# Patient Record
Sex: FEMALE | Race: WHITE
[De-identification: names, ages, dates, MRNs, and addresses within clinical notes are randomized per-mention and may not be internally consistent; named-entity substitution may affect disease eponyms.]

---

## 2017-10-18 ENCOUNTER — HOSPITAL ENCOUNTER (OUTPATIENT)
Dept: HOSPITAL 47 - RADUSWWP | Age: 57
End: 2017-10-18
Payer: COMMERCIAL

## 2017-10-18 DIAGNOSIS — N88.8: Primary | ICD-10-CM

## 2017-10-18 PROCEDURE — 76856 US EXAM PELVIC COMPLETE: CPT

## 2017-10-18 NOTE — US
EXAMINATION TYPE: US pelvic complete

 

DATE OF EXAM: 10/18/2017

 

COMPARISON: NONE

 

CLINICAL HISTORY: R10.2 Pelvic Pain. Patient stated upper right pelvic pain > upper left pelvic pain;
 ; post menopausal

 

TECHNIQUE:  Transabdominal (TA)

 

Date of LMP:  approximately age 50 

 

EXAM MEASUREMENTS:

 

Uterus:  6.8 x 4.2 x 2.6 cm

Endometrial Stripe: 0.4 cm

Right Ovary:  1.8 x 1.5 x 0.8 cm

Left Ovary:  1.8 x 1.2 x 1.6 cm

 

 

 

1. Uterus:  Anteverted; small Nabothian Cyst in CX = 0.5 x 0.6 x 0.5cm  

2. Endometrium:  wnl postmenopausal

3. Right Ovary:  wnl; bowel is imaged surrounding rt ovary

4. Left Ovary:  wnl

**Spectral, color and waveform doppler imaging shows arterial and venous flow within the ovaries; the
re is no evidence for ovarian torsion.

5. Bilateral Adnexa:  wnl

6. Posterior cul-de-sac:  wnl

 

 

 

IMPRESSION: 

1. Unremarkable appearance of the ovaries and uterus.

2. Endometrial thickness is within normal limits for a postmenopausal female.

3. Incidentally noted small nabothian cervical cyst.

## 2018-09-20 ENCOUNTER — HOSPITAL ENCOUNTER (OUTPATIENT)
Dept: HOSPITAL 47 - RADCTMAIN | Age: 58
Discharge: HOME | End: 2018-09-20
Attending: GENERAL PRACTICE
Payer: COMMERCIAL

## 2018-09-20 DIAGNOSIS — K56.41: ICD-10-CM

## 2018-09-20 DIAGNOSIS — N20.0: Primary | ICD-10-CM

## 2018-09-20 PROCEDURE — 74176 CT ABD & PELVIS W/O CONTRAST: CPT

## 2018-09-20 NOTE — CT
EXAMINATION TYPE: CT abdomen pelvis wo con

 

DATE OF EXAM: 9/20/2018

 

HISTORY: right sided flank pain

 

CT DLP: 793 mGycm.  Automated Exposure Control for Dose Reduction was Utilized.

 

TECHNIQUE:  CT scan of the abdomen and pelvis is performed without oral or IV contrast.

 

COMPARISON: NONE

 

FINDINGS:  Within the limitations of a non-contrast study, the following observations are made.

 

LUNG BASES: Fairly dense fibroglandular tissue is seen in visualized portion of both breasts. Coronar
y artery calcification is noted which is noted marker for coronary artery disease. This focal linear 
scarring anteriorly in the right lung base near axial image 5 and laterally in the right middle lobe 
near axial image 15.

 

LIVER/GB: Gallbladder is not visualized and presumed surgically absent. 

 

PANCREAS: There is 1.2 cm oval low dense lesion in the region of the mid pancreatic body coronal imag
e 34 without suspicious ductal dilatation near axial image 43 that warrants follow-up. There is addit
ional 1.4 x 1.0 cm low dense lesion in the inferior pancreatic head axial image 54 correlating with c
oronal image 34.

 

SPLEEN: No significant abnormality is seen.

 

ADRENALS: Slight low dense thickening to left adrenal gland favors benign hyperplasia axial image 43.


 

KIDNEYS: There are 2-3 small calculi left kidney measuring 2 mm or smaller in size. No right-sided re
nal calculi are seen. No hydronephrosis or obstructing ureteral calculi are clearly seen bilaterally.
 No intraluminal calculi are seen in bladder.

 

BOWEL: Evaluation of bowel is suboptimal secondary to lack of enteric contrast and patient having lit
tle intra-abdominal fat. Normal-appearing appendix is seen from cecum. There is no suspicious small o
r large bowel dilatation. There is fairly moderate to severe diffuse fecal prominence throughout the 
colon however noted.

 

GENITAL ORGANS: There is retroflexed but anteverted uterus projecting to the left of midline.

 

LYMPH NODES: No greater than 1cm abdominal or pelvic lymph nodes are appreciated.

 

OSSEOUS STRUCTURES: There is fairly moderate axial joint space loss in both hips.

 

OTHER: No significant additional abnormality is seen.

 

IMPRESSION:

1. Small left-sided renal calculi. No hydronephrosis or obstructing renal calculi clearly seen bilate
rally. No definitive right-sided renal calculi seen.

2. No bowel obstruction is present. There is however fairly moderate to severe diffuse colonic fecal 
stasis noted.

3. Nonspecific low dense pancreatic lesions, cannot exclude solid or cystic pancreatic masses. Furthe
r investigation with pancreatic protocol contrast-enhanced CT or MRI is advised to further evaluate a
nd characterize.

## 2018-11-19 ENCOUNTER — HOSPITAL ENCOUNTER (OUTPATIENT)
Dept: HOSPITAL 47 - RADUSWWP | Age: 58
End: 2018-11-19
Attending: INTERNAL MEDICINE
Payer: COMMERCIAL

## 2018-11-19 DIAGNOSIS — N18.3: Primary | ICD-10-CM

## 2018-11-19 PROCEDURE — 76700 US EXAM ABDOM COMPLETE: CPT

## 2018-11-19 NOTE — US
EXAMINATION TYPE: US abdomen complete

 

DATE OF EXAM: 11/19/2018

 

COMPARISON: CT 9/20/2018 CLINICAL HISTORY: 58-year-old female N18.3 Chronic Kidney Stage 3.

 

TECHNIQUE: Multiple sonographic images of the abdomen are obtained.

 

FINDINGS:

 

EXAM MEASUREMENTS:

 

Liver Length:  10.8 cm   

Gallbladder Wall:  0.3 cm   

CBD:  0.5 cm

Spleen:  9.3 cm   

Right Kidney:  9.2 x 4.3 x 4.7 cm 

Left Kidney:  9.2 x 5.0 x 6.0 cm   

 

Sonographer notes: Technically difficult study due to overlying bowel gas.

 

Pancreas:  not well visualized due to midline bowel gas

Liver:  wnl  

Gallbladder:  No stones seen

**Evidence for sonographic Mark's sign:  No

CBD:  wnl 

Spleen:  wnl   

Right Kidney:  No hydronephrosis.

Left Kidney:  No hydronephrosis.

Upper IVC:  wnl  

Abd Aorta:  wnl

 

 

 

 

 

IMPRESSION: 

Suboptimal visualization of the pancreas. Otherwise, unremarkable sonographic examination of the abdo
men.

## 2021-04-23 ENCOUNTER — HOSPITAL ENCOUNTER (OUTPATIENT)
Dept: HOSPITAL 47 - RADCTMAIN | Age: 61
Discharge: HOME | End: 2021-04-23
Attending: FAMILY MEDICINE
Payer: COMMERCIAL

## 2021-04-23 DIAGNOSIS — R14.0: Primary | ICD-10-CM

## 2021-04-23 LAB
ALBUMIN SERPL-MCNC: 3.6 G/DL (ref 3.5–5)
ALP SERPL-CCNC: 83 U/L (ref 38–126)
ALT SERPL-CCNC: 14 U/L (ref 4–34)
AMYLASE SERPL-CCNC: 104 U/L (ref 30–110)
ANION GAP SERPL CALC-SCNC: 6 MMOL/L
AST SERPL-CCNC: 29 U/L (ref 14–36)
BASOPHILS # BLD MANUAL: 0.07 K/UL (ref 0–0.2)
BUN SERPL-SCNC: 14 MG/DL (ref 7–17)
CALCIUM SPEC-MCNC: 8.9 MG/DL (ref 8.4–10.2)
CELLS COUNTED: 100
CHLORIDE SERPL-SCNC: 102 MMOL/L (ref 98–107)
CO2 SERPL-SCNC: 28 MMOL/L (ref 22–30)
ERYTHROCYTE [DISTWIDTH] IN BLOOD BY AUTOMATED COUNT: 3.69 M/UL (ref 3.8–5.4)
ERYTHROCYTE [DISTWIDTH] IN BLOOD: 12.2 % (ref 11.5–15.5)
GLUCOSE SERPL-MCNC: 128 MG/DL (ref 74–99)
HCT VFR BLD AUTO: 35.1 % (ref 34–46)
HGB BLD-MCNC: 12.2 GM/DL (ref 11.4–16)
LIPASE SERPL-CCNC: 100 U/L (ref 23–300)
LYMPHOCYTES # BLD MANUAL: 1.78 K/UL (ref 1–4.8)
MCH RBC QN AUTO: 33 PG (ref 25–35)
MCHC RBC AUTO-ENTMCNC: 34.6 G/DL (ref 31–37)
MCV RBC AUTO: 95.4 FL (ref 80–100)
MONOCYTES # BLD MANUAL: 0.28 K/UL (ref 0–1)
NEUTROPHILS NFR BLD MANUAL: 70 %
NEUTS SEG # BLD MANUAL: 4.97 K/UL (ref 1.3–7.7)
PLATELET # BLD AUTO: 289 K/UL (ref 150–450)
POTASSIUM SERPL-SCNC: 4.4 MMOL/L (ref 3.5–5.1)
PROT SERPL-MCNC: 6.8 G/DL (ref 6.3–8.2)
SODIUM SERPL-SCNC: 136 MMOL/L (ref 137–145)
WBC # BLD AUTO: 7.1 K/UL (ref 3.8–10.6)

## 2021-04-23 PROCEDURE — 83690 ASSAY OF LIPASE: CPT

## 2021-04-23 PROCEDURE — 85025 COMPLETE CBC W/AUTO DIFF WBC: CPT

## 2021-04-23 PROCEDURE — 80053 COMPREHEN METABOLIC PANEL: CPT

## 2021-04-23 PROCEDURE — 82150 ASSAY OF AMYLASE: CPT

## 2021-04-23 PROCEDURE — 74177 CT ABD & PELVIS W/CONTRAST: CPT

## 2021-04-23 PROCEDURE — 36415 COLL VENOUS BLD VENIPUNCTURE: CPT

## 2021-04-23 NOTE — CT
EXAMINATION TYPE: CT abdomen pelvis w con

 

DATE OF EXAM: 4/23/2021

 

COMPARISON: 9/20/2018

 

HISTORY: weight loss, diarrhea

 

CT DLP: 828 mGycm

 

CONTRAST: 

CT scan of the abdomen and pelvis is performed without Oral Contrast and with IV Contrast, patient in
jected with 100 mL of Isovue 370.

 

FINDINGS: 

LUNG BASES-: No visible nodule.  No infiltrate. 

 

LIVER/GB: The gallbladder is not clearly visualized. No space occupying hepatic lesion. Biliary tree 
is of normal caliber. 

 

PANCREAS:  No inflammation.  No distinct mass. 

 

SPLEEN:  No splenic enlargement.  No lesion seen. 

 

ADRENALS:  No nodule.  No thickening. 

 

KIDNEYS/BLADDER:  No hydronephrosis.  No nephrolithiasis.  No distinct renal mass.  Urinary bladder g
rossly unremarkable. 

 

BOWEL: There is moderate fluid distention of the stomach and large bowel and to a lesser extent the s
mall bowel. The findings may reflect gastroenterocolitis. No evidence for free air or active inflamma
tory process. The appendix is visualized and is normal..

 

GENITAL ORGANS:  No gross abnormality. 

 

LYMPH NODES:  No greater than 1cm abdominal or pelvic lymph nodes are appreciated.

 

AORTA: No significant abnormality. 

 

OSSEOUS STRUCTURES:  No significant abnormality is seen. 

 

OTHER:  No significant additional abnormality is seen. 

 

IMPRESSION: 

1. There is moderate fluid distention of the stomach and large bowel and to a lesser extent the small
 bowel. The findings may reflect gastroenterocolitis.

## 2021-08-24 ENCOUNTER — HOSPITAL ENCOUNTER (OUTPATIENT)
Dept: HOSPITAL 47 - LABWHC1 | Age: 61
Discharge: HOME | End: 2021-08-24
Attending: INTERNAL MEDICINE
Payer: COMMERCIAL

## 2021-08-24 DIAGNOSIS — E10.65: Primary | ICD-10-CM

## 2021-08-24 LAB — HBA1C MFR BLD: 5.2 % (ref 4–6)

## 2021-08-24 PROCEDURE — 84681 ASSAY OF C-PEPTIDE: CPT

## 2021-08-24 PROCEDURE — 80053 COMPREHEN METABOLIC PANEL: CPT

## 2021-08-24 PROCEDURE — 36415 COLL VENOUS BLD VENIPUNCTURE: CPT

## 2021-08-24 PROCEDURE — 83036 HEMOGLOBIN GLYCOSYLATED A1C: CPT

## 2021-08-25 LAB
ALBUMIN SERPL-MCNC: 4.3 G/DL (ref 3.8–4.9)
ALBUMIN/GLOB SERPL: 1.59 G/DL (ref 1.6–3.17)
ALP SERPL-CCNC: 88 U/L (ref 41–126)
ALT SERPL-CCNC: 22 U/L (ref 8–44)
ANION GAP SERPL CALC-SCNC: 7.2 MMOL/L (ref 4–12)
AST SERPL-CCNC: 32 U/L (ref 13–35)
BUN SERPL-SCNC: 18 MG/DL (ref 9–27)
BUN/CREAT SERPL: 18 RATIO (ref 12–20)
CALCIUM SPEC-MCNC: 9.2 MG/DL (ref 8.7–10.3)
CHLORIDE SERPL-SCNC: 103 MMOL/L (ref 96–109)
CO2 SERPL-SCNC: 28.8 MMOL/L (ref 21.6–31.8)
GLOBULIN SER CALC-MCNC: 2.7 G/DL (ref 1.6–3.3)
GLUCOSE SERPL-MCNC: 79 MG/DL (ref 70–110)
POTASSIUM SERPL-SCNC: 4.6 MMOL/L (ref 3.5–5.5)
PROT SERPL-MCNC: 7 G/DL (ref 6.2–8.2)
SODIUM SERPL-SCNC: 139 MMOL/L (ref 135–145)